# Patient Record
Sex: FEMALE | Race: WHITE | URBAN - METROPOLITAN AREA
[De-identification: names, ages, dates, MRNs, and addresses within clinical notes are randomized per-mention and may not be internally consistent; named-entity substitution may affect disease eponyms.]

---

## 2021-01-13 ENCOUNTER — TELEPHONE (OUTPATIENT)
Dept: BREAST CENTER | Age: 61
End: 2021-01-13

## 2021-01-28 ENCOUNTER — TELEPHONE (OUTPATIENT)
Dept: BREAST CENTER | Age: 61
End: 2021-01-28

## 2021-01-28 NOTE — TELEPHONE ENCOUNTER
Notified Petra Lott at [facility] in [place] that we have not heard back from the patient. Confirmed her contact information, a new phone number was provided. Spoke with patient in reference to her referral to the breast clinic and that she has been recommended a breast biopsy. Patient states she had a breast biopsy at [facility] in [place], [place] and it came back as cancer. I asked if she has been referred to another surgeon and/or oncologist locally to discuss her options. Patient states she has not. I offered her an appointment to see our surgeon here in our breast clinic. Patient adamantly states, Noe Malik is no way I am coming to [facility] for treatment. I want to stay here locally and go to the [facility]. \"     I notified Petra Lott at the [facility] in [place]. She is aware that patient is requesting to be referred locally for treatment.